# Patient Record
Sex: FEMALE | Race: WHITE | Employment: OTHER | URBAN - METROPOLITAN AREA
[De-identification: names, ages, dates, MRNs, and addresses within clinical notes are randomized per-mention and may not be internally consistent; named-entity substitution may affect disease eponyms.]

---

## 2018-03-08 ENCOUNTER — HOSPITAL ENCOUNTER (EMERGENCY)
Age: 28
Discharge: HOME | End: 2018-03-08
Attending: EMERGENCY MEDICINE
Payer: OTHER GOVERNMENT

## 2018-03-08 ENCOUNTER — APPOINTMENT (OUTPATIENT)
Dept: CT IMAGING | Age: 28
End: 2018-03-08
Attending: EMERGENCY MEDICINE
Payer: OTHER GOVERNMENT

## 2018-03-08 VITALS
DIASTOLIC BLOOD PRESSURE: 72 MMHG | TEMPERATURE: 97.5 F | RESPIRATION RATE: 18 BRPM | HEART RATE: 71 BPM | SYSTOLIC BLOOD PRESSURE: 109 MMHG | OXYGEN SATURATION: 100 %

## 2018-03-08 DIAGNOSIS — G43.809 OTHER MIGRAINE WITHOUT STATUS MIGRAINOSUS, NOT INTRACTABLE: Primary | ICD-10-CM

## 2018-03-08 LAB
ANION GAP SERPL CALC-SCNC: 8 MMOL/L (ref 3–18)
BASOPHILS # BLD: 0 K/UL (ref 0–0.06)
BASOPHILS NFR BLD: 0 % (ref 0–2)
BUN SERPL-MCNC: 10 MG/DL (ref 7–18)
BUN/CREAT SERPL: 13 (ref 12–20)
CALCIUM SERPL-MCNC: 9 MG/DL (ref 8.5–10.1)
CHLORIDE SERPL-SCNC: 106 MMOL/L (ref 100–108)
CO2 SERPL-SCNC: 25 MMOL/L (ref 21–32)
CREAT SERPL-MCNC: 0.78 MG/DL (ref 0.6–1.3)
DIFFERENTIAL METHOD BLD: ABNORMAL
EOSINOPHIL # BLD: 0.1 K/UL (ref 0–0.4)
EOSINOPHIL NFR BLD: 2 % (ref 0–5)
ERYTHROCYTE [DISTWIDTH] IN BLOOD BY AUTOMATED COUNT: 12.5 % (ref 11.6–14.5)
GLUCOSE SERPL-MCNC: 106 MG/DL (ref 74–99)
HCG SERPL QL: NEGATIVE
HCT VFR BLD AUTO: 39.8 % (ref 35–45)
HGB BLD-MCNC: 13.5 G/DL (ref 12–16)
LYMPHOCYTES # BLD: 1.5 K/UL (ref 0.9–3.6)
LYMPHOCYTES NFR BLD: 28 % (ref 21–52)
MCH RBC QN AUTO: 30.5 PG (ref 24–34)
MCHC RBC AUTO-ENTMCNC: 33.9 G/DL (ref 31–37)
MCV RBC AUTO: 89.8 FL (ref 74–97)
MONOCYTES # BLD: 0.7 K/UL (ref 0.05–1.2)
MONOCYTES NFR BLD: 12 % (ref 3–10)
NEUTS SEG # BLD: 3.2 K/UL (ref 1.8–8)
NEUTS SEG NFR BLD: 58 % (ref 40–73)
PLATELET # BLD AUTO: 335 K/UL (ref 135–420)
PMV BLD AUTO: 10.2 FL (ref 9.2–11.8)
POTASSIUM SERPL-SCNC: 3.8 MMOL/L (ref 3.5–5.5)
RBC # BLD AUTO: 4.43 M/UL (ref 4.2–5.3)
SODIUM SERPL-SCNC: 139 MMOL/L (ref 136–145)
WBC # BLD AUTO: 5.6 K/UL (ref 4.6–13.2)

## 2018-03-08 PROCEDURE — 70450 CT HEAD/BRAIN W/O DYE: CPT

## 2018-03-08 PROCEDURE — 84703 CHORIONIC GONADOTROPIN ASSAY: CPT

## 2018-03-08 PROCEDURE — 99281 EMR DPT VST MAYX REQ PHY/QHP: CPT

## 2018-03-08 PROCEDURE — 96374 THER/PROPH/DIAG INJ IV PUSH: CPT

## 2018-03-08 PROCEDURE — 80048 BASIC METABOLIC PNL TOTAL CA: CPT

## 2018-03-08 PROCEDURE — 96375 TX/PRO/DX INJ NEW DRUG ADDON: CPT

## 2018-03-08 PROCEDURE — 85025 COMPLETE CBC W/AUTO DIFF WBC: CPT

## 2018-03-08 PROCEDURE — 74011250636 HC RX REV CODE- 250/636: Performed by: EMERGENCY MEDICINE

## 2018-03-08 PROCEDURE — 96361 HYDRATE IV INFUSION ADD-ON: CPT

## 2018-03-08 RX ORDER — OXYCODONE AND ACETAMINOPHEN 5; 325 MG/1; MG/1
TABLET ORAL
COMMUNITY
End: 2019-05-30

## 2018-03-08 RX ORDER — DEXAMETHASONE SODIUM PHOSPHATE 4 MG/ML
10 INJECTION, SOLUTION INTRA-ARTICULAR; INTRALESIONAL; INTRAMUSCULAR; INTRAVENOUS; SOFT TISSUE ONCE
Status: COMPLETED | OUTPATIENT
Start: 2018-03-08 | End: 2018-03-08

## 2018-03-08 RX ORDER — TRAMADOL HYDROCHLORIDE 50 MG/1
50 TABLET ORAL
COMMUNITY
End: 2019-05-30

## 2018-03-08 RX ORDER — DEXAMETHASONE SODIUM PHOSPHATE 100 MG/10ML
10 INJECTION INTRAMUSCULAR; INTRAVENOUS EVERY 6 HOURS
Status: DISCONTINUED | OUTPATIENT
Start: 2018-03-08 | End: 2018-03-08 | Stop reason: SDUPTHER

## 2018-03-08 RX ORDER — BUTALBITAL, ACETAMINOPHEN, CAFFEINE AND CODEINE PHOSPHATE 50; 325; 40; 30 MG/1; MG/1; MG/1; MG/1
CAPSULE ORAL
COMMUNITY

## 2018-03-08 RX ORDER — ONDANSETRON 4 MG/1
4 TABLET, FILM COATED ORAL
COMMUNITY

## 2018-03-08 RX ORDER — KETOROLAC TROMETHAMINE 30 MG/ML
30 INJECTION, SOLUTION INTRAMUSCULAR; INTRAVENOUS
Status: COMPLETED | OUTPATIENT
Start: 2018-03-08 | End: 2018-03-08

## 2018-03-08 RX ORDER — METOCLOPRAMIDE HYDROCHLORIDE 5 MG/ML
10 INJECTION INTRAMUSCULAR; INTRAVENOUS
Status: COMPLETED | OUTPATIENT
Start: 2018-03-08 | End: 2018-03-08

## 2018-03-08 RX ADMIN — KETOROLAC TROMETHAMINE 30 MG: 30 INJECTION, SOLUTION INTRAMUSCULAR at 06:46

## 2018-03-08 RX ADMIN — METOCLOPRAMIDE 10 MG: 5 INJECTION, SOLUTION INTRAMUSCULAR; INTRAVENOUS at 05:48

## 2018-03-08 RX ADMIN — SODIUM CHLORIDE 1000 ML: 900 INJECTION, SOLUTION INTRAVENOUS at 05:48

## 2018-03-08 RX ADMIN — DEXAMETHASONE SODIUM PHOSPHATE 10 MG: 4 INJECTION, SOLUTION INTRAMUSCULAR; INTRAVENOUS at 06:45

## 2018-03-08 NOTE — DISCHARGE INSTRUCTIONS
Migraine Headache: Care Instructions  Your Care Instructions  Migraines are painful, throbbing headaches that often start on one side of the head. They may cause nausea and vomiting and make you sensitive to light, sound, or smell. Without treatment, migraines can last from 4 hours to a few days. Medicines can help prevent migraines or stop them after they have started. Your doctor can help you find which ones work best for you. Follow-up care is a key part of your treatment and safety. Be sure to make and go to all appointments, and call your doctor if you are having problems. It's also a good idea to know your test results and keep a list of the medicines you take. How can you care for yourself at home? · Do not drive if you have taken a prescription pain medicine. · Rest in a quiet, dark room until your headache is gone. Close your eyes, and try to relax or go to sleep. Don't watch TV or read. · Put a cold, moist cloth or cold pack on the painful area for 10 to 20 minutes at a time. Put a thin cloth between the cold pack and your skin. · Use a warm, moist towel or a heating pad set on low to relax tight shoulder and neck muscles. · Have someone gently massage your neck and shoulders. · Take your medicines exactly as prescribed. Call your doctor if you think you are having a problem with your medicine. You will get more details on the specific medicines your doctor prescribes. · Be careful not to take pain medicine more often than the instructions allow. You could get worse or more frequent headaches when the medicine wears off. To prevent migraines  · Keep a headache diary so you can figure out what triggers your headaches. Avoiding triggers may help you prevent headaches. Record when each headache began, how long it lasted, and what the pain was like.  (Was it throbbing, aching, stabbing, or dull?) Write down any other symptoms you had with the headache, such as nausea, flashing lights or dark spots, or sensitivity to bright light or loud noise. Note if the headache occurred near your period. List anything that might have triggered the headache. Triggers may include certain foods (chocolate, cheese, wine) or odors, smoke, bright light, stress, or lack of sleep. · If your doctor has prescribed medicine for your migraines, take it as directed. You may have medicine that you take only when you get a migraine and medicine that you take all the time to help prevent migraines. ¨ If your doctor has prescribed medicine for when you get a headache, take it at the first sign of a migraine, unless your doctor has given you other instructions. ¨ If your doctor has prescribed medicine to prevent migraines, take it exactly as prescribed. Call your doctor if you think you are having a problem with your medicine. · Find healthy ways to deal with stress. Migraines are most common during or right after stressful times. Take time to relax before and after you do something that has caused a migraine in the past.  · Try to keep your muscles relaxed by keeping good posture. Check your jaw, face, neck, and shoulder muscles for tension. Try to relax them. When you sit at a desk, change positions often. And make sure to stretch for 30 seconds each hour. · Get plenty of sleep and exercise. · Eat meals on a regular schedule. Avoid foods and drinks that often trigger migraines. These include chocolate, alcohol (especially red wine and port), aspartame, monosodium glutamate (MSG), and some additives found in foods (such as hot dogs, soler, cold cuts, aged cheeses, and pickled foods). · Limit caffeine. Don't drink too much coffee, tea, or soda. But don't quit caffeine suddenly. That can also give you migraines. · Do not smoke or allow others to smoke around you. If you need help quitting, talk to your doctor about stop-smoking programs and medicines. These can increase your chances of quitting for good.   · If you are taking birth control pills or hormone therapy, talk to your doctor about whether they are triggering your migraines. When should you call for help? Call 911 anytime you think you may need emergency care. For example, call if:  ? · You have signs of a stroke. These may include:  ¨ Sudden numbness, paralysis, or weakness in your face, arm, or leg, especially on only one side of your body. ¨ Sudden vision changes. ¨ Sudden trouble speaking. ¨ Sudden confusion or trouble understanding simple statements. ¨ Sudden problems with walking or balance. ¨ A sudden, severe headache that is different from past headaches. ?Call your doctor now or seek immediate medical care if:  ? · You have new or worse nausea and vomiting. ? · You have a new or higher fever. ? · Your headache gets much worse. ? Watch closely for changes in your health, and be sure to contact your doctor if:  ? · You are not getting better after 2 days (48 hours). Where can you learn more? Go to http://janay-elvia.info/. Enter S590 in the search box to learn more about \"Migraine Headache: Care Instructions. \"  Current as of: October 14, 2016  Content Version: 11.4  © 5662-8824 [employer]. Care instructions adapted under license by [employer] (which disclaims liability or warranty for this information). If you have questions about a medical condition or this instruction, always ask your healthcare professional. [place] [address] any warranty or liability for your use of this information.

## 2018-03-08 NOTE — ED PROVIDER NOTES
EMERGENCY DEPARTMENT HISTORY AND PHYSICAL EXAM    5:30 AM      Date: 3/8/2018  Patient Name: Iron Austin    History of Presenting Illness     Chief Complaint   Patient presents with    Headache         History Provided By: Patient    Chief Complaint: HA  Duration:  Hours (x2.5)  Timing:  Acute  Location: Diffuse  Quality: Sharp  Severity: N/A  Modifying Factors: No relief with Fioricet  Associated Symptoms: exhbits nausea, emesis, photophobia, and audiophobia; Denies CP, SOB, abdominal pain, and changes in her vision      Additional History (Context): Andrew Hernandez is a 24 y.o. female with migraines and endometriosis who presents to the ED c/o migraine HA onset two and a half hours PTA. Pt states this migraine woke her up from her sleep and the pain was localized to the front portion of her head; pain has since radiated diffusely throughout her head. Pt has a hx of migraines and has tried multiple different treatment plans, including blood pressure and seizure medications. Pt is currently on Fioricet and undergoing a new form of acupuncture (that she started two days ago) where the needles are left in the pt's right ear until they fall out on their own. Pt has not had relief with any of these methods. Pt notes this migraine is much worse than her previous migraines, noting the pain is more sharp \"like a bunch of needles\", and it is also accompanied by emesis; pt has nausea medication at home because her migraines are typically accompanied by nausea however she did not take it this morning. Pt has experienced nausea, photophobia, and audiophobia with this migraine however she denies CP, SOB, abdominal pain, and changes in her vision. LNMP was January 10 (almost two months ago); pt typically has irregular menstrual periods due to endometriosis however she has never skipped a month. Pt took a home pregnancy test yesterday that was negative.     PCP: PROVIDER UNKNOWN    Current Outpatient Prescriptions   Medication Sig Dispense Refill    traMADol (ULTRAM) 50 mg tablet Take 50 mg by mouth every six (6) hours as needed for Pain.  codeine-butalbital-acetaminophen-caffeine (FIORICET WITH CODEINE) -36-30 mg capsule Take  by mouth.  oxyCODONE-acetaminophen (PERCOCET) 5-325 mg per tablet Take  by mouth every four (4) hours as needed for Pain.  ondansetron hcl (ZOFRAN, AS HYDROCHLORIDE,) 4 mg tablet Take 4 mg by mouth every eight (8) hours as needed for Nausea. Past History     Past Medical History:  Past Medical History:   Diagnosis Date    Endometriosis     Migraines        Past Surgical History:  Past Surgical History:   Procedure Laterality Date    HX APPENDECTOMY      HX DILATION AND CURETTAGE         Family History:  History reviewed. No pertinent family history. Social History:  Social History   Substance Use Topics    Smoking status: Never Smoker    Smokeless tobacco: Never Used    Alcohol use No       Allergies: Allergies   Allergen Reactions    Penicillins Unknown (comments)         Review of Systems     Review of Systems   Constitutional: Negative for chills. HENT: Negative for congestion and sore throat. Positive for audiophobia   Eyes: Positive for photophobia. Negative for visual disturbance. Respiratory: Negative for cough and shortness of breath. Cardiovascular: Negative for chest pain. Gastrointestinal: Positive for nausea and vomiting. Negative for abdominal pain and diarrhea. Genitourinary: Negative for dysuria. Musculoskeletal: Negative for back pain. Skin: Negative for rash. Neurological: Positive for headaches. Negative for dizziness. All other systems reviewed and are negative. Physical Exam     Visit Vitals    BP (!) 129/99    Pulse 84    Temp 98 °F (36.7 °C)    Resp 18    SpO2 100%       Physical Exam   Constitutional: She is oriented to person, place, and time.  She appears well-developed and well-nourished. Non-toxic appearance. She does not appear ill. She appears distressed (mild). HENT:   Head: Normocephalic and atraumatic. Mouth/Throat: Oropharynx is clear and moist.   8 acupuncture needles present to right ear   Eyes: Conjunctivae and EOM are normal. Pupils are equal, round, and reactive to light. Right eye exhibits no discharge. Left eye exhibits no discharge. Neck: Normal range of motion. Neck supple. Cardiovascular: Normal rate, regular rhythm, normal heart sounds and intact distal pulses. Exam reveals no gallop and no friction rub. No murmur heard. Pulmonary/Chest: Effort normal and breath sounds normal. No respiratory distress. She has no wheezes. She has no rales. Abdominal: Soft. Bowel sounds are normal. She exhibits no distension. There is no tenderness. There is no rebound and no guarding. Musculoskeletal: Normal range of motion. She exhibits no edema or tenderness. Lymphadenopathy:     She has no cervical adenopathy. Neurological: She is alert and oriented to person, place, and time. She has normal strength. No cranial nerve deficit or sensory deficit. Gait normal. GCS eye subscore is 4. GCS verbal subscore is 5. GCS motor subscore is 6. Skin: Skin is warm and dry. No rash noted. Psychiatric: She has a normal mood and affect. Nursing note and vitals reviewed. Diagnostic Study Results     Labs -  Recent Results (from the past 12 hour(s))   CBC WITH AUTOMATED DIFF    Collection Time: 03/08/18  5:15 AM   Result Value Ref Range    WBC 5.6 4.6 - 13.2 K/uL    RBC 4.43 4.20 - 5.30 M/uL    HGB 13.5 12.0 - 16.0 g/dL    HCT 39.8 35.0 - 45.0 %    MCV 89.8 74.0 - 97.0 FL    MCH 30.5 24.0 - 34.0 PG    MCHC 33.9 31.0 - 37.0 g/dL    RDW 12.5 11.6 - 14.5 %    PLATELET 265 946 - 413 K/uL    MPV 10.2 9.2 - 11.8 FL    NEUTROPHILS 58 40 - 73 %    LYMPHOCYTES 28 21 - 52 %    MONOCYTES 12 (H) 3 - 10 %    EOSINOPHILS 2 0 - 5 %    BASOPHILS 0 0 - 2 %    ABS.  NEUTROPHILS 3.2 1.8 - 8.0 K/UL    ABS. LYMPHOCYTES 1.5 0.9 - 3.6 K/UL    ABS. MONOCYTES 0.7 0.05 - 1.2 K/UL    ABS. EOSINOPHILS 0.1 0.0 - 0.4 K/UL    ABS. BASOPHILS 0.0 0.0 - 0.06 K/UL    DF AUTOMATED     METABOLIC PANEL, BASIC    Collection Time: 03/08/18  5:15 AM   Result Value Ref Range    Sodium 139 136 - 145 mmol/L    Potassium 3.8 3.5 - 5.5 mmol/L    Chloride 106 100 - 108 mmol/L    CO2 25 21 - 32 mmol/L    Anion gap 8 3.0 - 18 mmol/L    Glucose 106 (H) 74 - 99 mg/dL    BUN 10 7.0 - 18 MG/DL    Creatinine 0.78 0.6 - 1.3 MG/DL    BUN/Creatinine ratio 13 12 - 20      GFR est AA >60 >60 ml/min/1.73m2    GFR est non-AA >60 >60 ml/min/1.73m2    Calcium 9.0 8.5 - 10.1 MG/DL   HCG QL SERUM    Collection Time: 03/08/18  5:15 AM   Result Value Ref Range    HCG, Ql. NEGATIVE  NEG         Radiologic Studies -   CT HEAD WO CONT      Final Results:    Findings:     No evidence of an acute intracranial abnormality. Availability of results reported to: ED   Date/Time Provider Notified: 3/8/18 @ [phone]            Medical Decision Making   I am the first provider for this patient. I reviewed the vital signs, available nursing notes, past medical history, past surgical history, family history and social history. Vital Signs-Reviewed the patient's vital signs. Pulse Oximetry Analysis -  100% on room air     Cardiac Monitor:  Rate: 84  Rhythm:  Normal Sinus Rhythm     Records Reviewed: Nursing Notes and Old Medical Records (Time of Review: 5:30 AM)    ED Course: Progress Notes, Reevaluation, and Consults:    Provider Notes (Medical Decision Making): Pt with h/o migraines presenting with worsening migraine today. CT negative for acute process. Doubt need for LP at this time to rule out SAH as CT within 6 hrs of onset, pt is well appearing with normal neuro exam, and has no meningeal signs. Doubt meningitis. Labs reviewed. Feeling better with migraine cocktail and decadron. Encouraged follow up with Neurology.  Also advised to contact place that inserted accupuncture needles into ear to have them removed if they are not helping. Pt aware of return precautions and comfortable with plan for discharge. Diagnosis     Clinical Impression:   1. Other migraine without status migrainosus, not intractable        Disposition: Discharge    Follow-up Information     Follow up With Details Comments [facility] Schedule an appointment as soon as possible for a visit  [address] [address] [phone]           Patient's Medications   Start Taking    No medications on file   Continue Taking    CODEINE-BUTALBITAL-ACETAMINOPHEN-CAFFEINE (FIORICET WITH CODEINE) -74-30 MG CAPSULE    Take  by mouth. ONDANSETRON HCL (ZOFRAN, AS HYDROCHLORIDE,) 4 MG TABLET    Take 4 mg by mouth every eight (8) hours as needed for Nausea. OXYCODONE-ACETAMINOPHEN (PERCOCET) 5-325 MG PER TABLET    Take  by mouth every four (4) hours as needed for Pain. TRAMADOL (ULTRAM) 50 MG TABLET    Take 50 mg by mouth every six (6) hours as needed for Pain. These Medications have changed    No medications on file   Stop Taking    No medications on file     _______________________________    Attestations:  [address] [place] [address] acting as a scribe for and in the presence of Jessica Hannah MD      March 08, 2018 at 5:30 AM       Provider Attestation:      I personally performed the services described in the documentation, reviewed the documentation, as recorded by the scribe in my presence, and it accurately and completely records my words and actions.  March 08, 2018 at 5:30 AM - Jessica Hannah MD    _______________________________

## 2019-05-30 ENCOUNTER — HOSPITAL ENCOUNTER (OUTPATIENT)
Age: 29
Setting detail: OBSERVATION
Discharge: HOME | End: 2019-05-30
Attending: OBSTETRICS & GYNECOLOGY | Admitting: OBSTETRICS & GYNECOLOGY
Payer: OTHER GOVERNMENT

## 2019-05-30 VITALS
SYSTOLIC BLOOD PRESSURE: 137 MMHG | TEMPERATURE: 98.7 F | HEART RATE: 91 BPM | DIASTOLIC BLOOD PRESSURE: 79 MMHG | BODY MASS INDEX: 36.64 KG/M2 | HEIGHT: 66 IN | WEIGHT: 228 LBS
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
## 2019-05-30 PROBLEM — Z34.90 INTRAUTERINE PREGNANCY: Status: ACTIVE | Noted: 2019-05-30

## 2019-05-30 PROBLEM — O16.3 ELEVATED BLOOD PRESSURE AFFECTING PREGNANCY IN THIRD TRIMESTER, ANTEPARTUM: Status: ACTIVE | Noted: 2019-05-30

## 2019-05-30 LAB
ALBUMIN SERPL-MCNC: 2.5 G/DL (ref 3.4–5)
ALBUMIN/GLOB SERPL: 0.7 {RATIO} (ref 0.8–1.7)
ALP SERPL-CCNC: 137 U/L (ref 45–117)
ALT SERPL-CCNC: 15 U/L (ref 13–56)
ANION GAP SERPL CALC-SCNC: 10 MMOL/L (ref 3–18)
AST SERPL-CCNC: 12 U/L (ref 15–37)
BILIRUB SERPL-MCNC: 0.2 MG/DL (ref 0.2–1)
BUN SERPL-MCNC: 11 MG/DL (ref 7–18)
BUN/CREAT SERPL: 20 (ref 12–20)
CALCIUM SERPL-MCNC: 8.5 MG/DL (ref 8.5–10.1)
CHLORIDE SERPL-SCNC: 107 MMOL/L (ref 100–108)
CO2 SERPL-SCNC: 21 MMOL/L (ref 21–32)
CREAT SERPL-MCNC: 0.55 MG/DL (ref 0.6–1.3)
CREAT UR-MCNC: 151 MG/DL (ref 30–125)
ERYTHROCYTE [DISTWIDTH] IN BLOOD BY AUTOMATED COUNT: 14.2 % (ref 11.6–14.5)
GLOBULIN SER CALC-MCNC: 3.8 G/DL (ref 2–4)
GLUCOSE SERPL-MCNC: 96 MG/DL (ref 74–99)
HCT VFR BLD AUTO: 32.5 % (ref 35–45)
HGB BLD-MCNC: 10.5 G/DL (ref 12–16)
LDH SERPL L TO P-CCNC: 175 U/L (ref 81–234)
MCH RBC QN AUTO: 28.6 PG (ref 24–34)
MCHC RBC AUTO-ENTMCNC: 32.3 G/DL (ref 31–37)
MCV RBC AUTO: 88.6 FL (ref 74–97)
PLATELET # BLD AUTO: 263 K/UL (ref 135–420)
PMV BLD AUTO: 10 FL (ref 9.2–11.8)
POTASSIUM SERPL-SCNC: 3.9 MMOL/L (ref 3.5–5.5)
PROT SERPL-MCNC: 6.3 G/DL (ref 6.4–8.2)
PROT UR-MCNC: 24 MG/DL
PROT/CREAT UR-RTO: 0.2
RBC # BLD AUTO: 3.67 M/UL (ref 4.2–5.3)
SODIUM SERPL-SCNC: 138 MMOL/L (ref 136–145)
URATE SERPL-MCNC: 3.5 MG/DL (ref 2.6–7.2)
WBC # BLD AUTO: 9 K/UL (ref 4.6–13.2)

## 2019-05-30 PROCEDURE — 80053 COMPREHEN METABOLIC PANEL: CPT

## 2019-05-30 PROCEDURE — 99218 HC RM OBSERVATION: CPT

## 2019-05-30 PROCEDURE — 83615 LACTATE (LD) (LDH) ENZYME: CPT

## 2019-05-30 PROCEDURE — 99282 EMERGENCY DEPT VISIT SF MDM: CPT

## 2019-05-30 PROCEDURE — 59025 FETAL NON-STRESS TEST: CPT

## 2019-05-30 PROCEDURE — 85027 COMPLETE CBC AUTOMATED: CPT

## 2019-05-30 PROCEDURE — 36415 COLL VENOUS BLD VENIPUNCTURE: CPT

## 2019-05-30 PROCEDURE — 84156 ASSAY OF PROTEIN URINE: CPT

## 2019-05-30 PROCEDURE — 84550 ASSAY OF BLOOD/URIC ACID: CPT

## 2019-05-30 PROCEDURE — 76815 OB US LIMITED FETUS(S): CPT

## 2019-05-30 NOTE — DISCHARGE SUMMARY
Obstetrical Discharge Summary     Name: Timothy Leahy MRN: [id number]  SSN: [id number]    YOB: 1990  Age: 34 y.o. Sex: female      Admit Date: 5/30/2019    Discharge Date: 5/30/2019     Admitting Physician: Jhonatan Nunez MD     Attending Physician:  Leona Jones MD     Admission Diagnoses: Intrauterine pregnancy [Z34.90]  Fall [W19. XXXA]    Discharge Diagnoses: This patient has no babies on file. Additional Diagnoses:   Hospital Problems  Date Reviewed: 5/30/2019          Codes Class Noted POA    Fall ICD-10-CM: W19. Myrl Mon  ICD-9-CM: E888.9  5/30/2019 Unknown        Intrauterine pregnancy ICD-10-CM: Z34.90  ICD-9-CM: V22.2  5/30/2019 Unknown        Elevated blood pressure affecting pregnancy in third trimester, antepartum ICD-10-CM: O16.3  ICD-9-CM: 642.33  5/30/2019 Yes           No results found for: RUBELLAEXT, GRBSEXT    Immunization(s):   There is no immunization history on file for this patient. Labs:   Recent Results (from the past 24 hour(s))   PROTEIN/CREATININE RATIO, URINE    Collection Time: 05/30/19  9:35 AM   Result Value Ref Range    Protein, urine random 24 (H) <11.9 mg/dL    Creatinine, urine 151.00 (H) 30 - 125 mg/dL    Protein/Creat.  urine Ratio 0.2     CBC W/O DIFF    Collection Time: 05/30/19 10:26 AM   Result Value Ref Range    WBC 9.0 4.6 - 13.2 K/uL    RBC 3.67 (L) 4.20 - 5.30 M/uL    HGB 10.5 (L) 12.0 - 16.0 g/dL    HCT 32.5 (L) 35.0 - 45.0 %    MCV 88.6 74.0 - 97.0 FL    MCH 28.6 24.0 - 34.0 PG    MCHC 32.3 31.0 - 37.0 g/dL    RDW 14.2 11.6 - 14.5 %    PLATELET 600 369 - 675 K/uL    MPV 10.0 9.2 - 11.5 FL   METABOLIC PANEL, COMPREHENSIVE    Collection Time: 05/30/19 10:26 AM   Result Value Ref Range    Sodium 138 136 - 145 mmol/L    Potassium 3.9 3.5 - 5.5 mmol/L    Chloride 107 100 - 108 mmol/L    CO2 21 21 - 32 mmol/L    Anion gap 10 3.0 - 18 mmol/L    Glucose 96 74 - 99 mg/dL    BUN 11 7.0 - 18 MG/DL    Creatinine 0.55 (L) 0.6 - 1.3 MG/DL    BUN/Creatinine ratio 20 12 - 20      GFR est AA >60 >60 ml/min/1.73m2    GFR est non-AA >60 >60 ml/min/1.73m2    Calcium 8.5 8.5 - 10.1 MG/DL    Bilirubin, total 0.2 0.2 - 1.0 MG/DL    ALT (SGPT) 15 13 - 56 U/L    AST (SGOT) 12 (L) 15 - 37 U/L    Alk. phosphatase 137 (H) 45 - 117 U/L    Protein, total 6.3 (L) 6.4 - 8.2 g/dL    Albumin 2.5 (L) 3.4 - 5.0 g/dL    Globulin 3.8 2.0 - 4.0 g/dL    A-G Ratio 0.7 (L) 0.8 - 1.7     URIC ACID    Collection Time: 19 10:26 AM   Result Value Ref Range    Uric acid 3.5 2.6 - 7.2 MG/DL       Exam:  Genera:  Alert & oriented, no apparent distress  CV:  RRR  Pulm:  CTAB  Abdomen:  Soft, non distended, nontender to palpation, firm fundus below umbilicus  Extremities:  LE b/l without clubbing/cyanosis/edema/redness, nontender to palpation    Hospital Course: 34 y.o.  at 37.2wks evaluated after fall onto hands and knees. Monitoring x 4hrs with resolution of ctxs, FHTs remains reassuring. BPs normal to mild range. She continues to deny preeclampsia symptoms. Labs wnl, urine Pr/Cr 0.2. She will be seen by her OB tomorrow for follow up regarding elevated BPs. Discussed GHTN and concern for preeclampsia. Okay for DC today, discharge instructions and labor/preeclampsia precautions reviewed. Patient Instructions:   Current Discharge Medication List      CONTINUE these medications which have NOT CHANGED    Details   codeine-butalbital-acetaminophen-caffeine (FIORICET WITH CODEINE) -93-30 mg capsule Take  by mouth. ondansetron hcl (ZOFRAN, AS HYDROCHLORIDE,) 4 mg tablet Take 4 mg by mouth every eight (8) hours as needed for Nausea. STOP taking these medications       traMADol (ULTRAM) 50 mg tablet Comments:   Reason for Stopping:         oxyCODONE-acetaminophen (PERCOCET) 5-325 mg per tablet Comments:   Reason for Stopping:               Reference my discharge instructions.     Follow-up Appointments   Procedures    FOLLOW UP VISIT Appointment in: 3 - 5 Days Follow up with regular OB at [facility] at scheduled appt. She will stop by tomorrow to update and follow up BP. Follow up with [facility] at scheduled appt. She will stop by tomorrow to update and follow up BP.      Standing Status:   Standing     Number of Occurrences:   1     Order Specific Question:   Appointment in     Answer:   3 - 5 Days        Signed By:  Mirza Ramachandran MD     May 30, 2019

## 2019-05-30 NOTE — PROGRESS NOTES
33 yo cf in via ambulance [place] pt landed on hands, knees and belly.  edc, 19 37 .hx gdm diet controlled.

## 2019-05-30 NOTE — PROGRESS NOTES
D/c teaching completed. reviewed pregnancy precautions.  Pt to keep all ob appt at [facility] off floor in stable condition

## 2019-05-30 NOTE — H&P
Obstetrical Problem History & Physical     Name: Timothy Leahy MRN: [id number]  SSN: [id number]    YOB: 1990  Age: 34 y.o. Sex: female        Subjective:   Chief complaint:    Chief Complaint   Patient presents with    Fall     27yo  at 37.2wks with ADEBAYO 19 by 1st trimester US c/w LMP per pt. She followed with [facility] for prenatal care. Pregnancy complicated by GDMA1, and previously elevated BPs on 1 occasion per pt when was in [place]. She denies HA, visual changes, RUQ/epigastric pain, fevers, chills, N/V/D. She reports \"rolled my ankle\" and landed on hands and knees, thinks abdomen touched the ground but did not have full force fall onto abdomen. Reports unchanged irregular \"celina gomez\" ctxs. Denies VB/LOF.  +FM. OB HISTORY  OB History        1    Para        Term                AB        Living           SAB        TAB        Ectopic        Molar        Multiple        Live Births                G1 - current, GDMA1. Reports FSBS fasting <95, 1-2hr PP     PAST MEDICAL HISTORY  Past Medical History:   Diagnosis Date    Endometriosis     Migraines        PAST SURGICAL HISTORY  1.  [address] appendectomy 2015  2. Dx [address] [facility] for endometriosis 2017    SOCIAL HISTORY  Former smoker 1/2ppd x 1yr, quit several yrs ago. 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 Denies h/o or current illicit drug use. [facility], in [facility]. FAMILY HISTORY  Denies    ALLERGY:  Allergies   Allergen Reactions    Penicillins Swelling   causes redness/rash as a baby, has not tried since then    HOME MEDICATIONS:  1. Prenatal vitamin 1 tablet po daily    Review of Systems:  A comprehensive review of systems was negative except other than stated in HPI.     Objective:   VITAL SIGNS:  Visit Vitals  BP (!) 136/97   Pulse 98   Temp 98.7 °F (37.1 °C)   Ht 5' 6\" (1.676 m)   Wt 103.4 kg (228 lb)   BMI 36.80 kg/m²       Physical Exam:  Gen:  A&O, NAD  CV: RRR  Pulm:  CTAB  Abd:  Soft, gravid, nttp  Cervix:  Cl/th/hi, posterior, firm  Ext:  No clubbing/cyanosis/edema/redness, nttp, scraped skin without abrasions or bleeding on b/l knees  Neuro:  1+ DTRs LE b/l, no clonus    BSUS:  Cephalic, anterior intact placenta without evidence of abruption, BPP 10/10, MFP 5.7cm    TOCO:  q6-10min  FHTs:  140s, mod variability, +accels, no decels    Assessment:     27yo  at 37.2wks by stated ADEBAYO c/w exam  - Fall, no direct abdominal trauma. Fall occurred at [place] [address]. Will monitor until 1145 (4hrs post fall)  - Elevated BPs. Mild range, asymptomatic. Will obtain labs, urine Pr/Cr ratio. Pt reported h/o elevated BPs in the past.  Given repeat elevated today, reviewed GHTN with pt. Prenatal records currently unavailable. Pt reports already scheduled for IOL , and wishes to keep this date for IOL after reviewing IOL for GHTN at 37wks.         Signed By:    Aubree Owens MD  May 30, 2019 9:27 AM